# Patient Record
(demographics unavailable — no encounter records)

---

## 2024-10-10 NOTE — ADDENDUM
[FreeTextEntry1] : This note was written by Beth Jara on 10/07/2024 acting solely as a scribe for Dr. Papo Colby.   All medical record entries made by the Scribe were at my, Dr. Papo Colby, direction and personally dictated by me on 10/07/2024. I have personally reviewed the chart and agree that the record accurately reflects my personal performance of the history, physical exam, assessment and plan.

## 2024-10-10 NOTE — HISTORY OF PRESENT ILLNESS
[de-identified] : 63 year old male presents today with left hamstring injury x 1 day. Patient was in parking lot, made a quick move hyperextended his leg and fell back. Patient was able to get up and bear weight after the injury. C/o pain in the hamstring and buttock. He is not taking pain medication.

## 2024-10-10 NOTE — DISCUSSION/SUMMARY
[de-identified] : 64 y/o male with left hamstring injury  Patient presents with pain to the hamstring s/p hyperextension in a parking lot.  Patient is currently undergoing treatment for an Achilles tendon rupture.  Clinically, he has pain over the distal lateral hamstring as well as the proximal hamstring insertion.  I discussed that the mechanism typically results from hip flexion and knee extension. On clinical examination the patient does not have significant posterior ecchymosis or a palpable mass/defect within the thigh.   I discussed the utility of MRI imaging to evaluate for proximal avulsion of the hamstring tendons. I discussed treatment for this that may include conservative management and flexibility/strengthening rehabilitation with physical therapy guidance vs surgical repair. I discussed that MRI imaging would delineate next steps of treatment.   Recommendation; Ice, compression bandage, NSAIDS versus Tylenol to reduce pain and inflammation. MRI Rx given. Activity modification.   Follow-up after MRI

## 2024-10-10 NOTE — HISTORY OF PRESENT ILLNESS
[de-identified] : 63 year old male presents today with left hamstring injury x 1 day. Patient was in parking lot, made a quick move hyperextended his leg and fell back. Patient was able to get up and bear weight after the injury. C/o pain in the hamstring and buttock. He is not taking pain medication.

## 2024-10-10 NOTE — PHYSICAL EXAM
[de-identified] : Left Lower Extremity Exam:  Skin: Clean, dry, intact Inspection: No obvious malalignment, no masses, no swelling. Pulses: 2+ DP/PT pulses ROM: Full knee ROM, full ankle ROM Tenderness: + pain to the distal lateral hamstring, ttp proximal hamstring insertion Stability: Ankle A/P stable, Knee stable varus/valgus/drawer Strength: 3/5 H 5/5 Q/TA/GS/EHL, without atrophy Neuro: In tact to light touch throughout, DTR's normal Additional tests: none [de-identified] : The following radiographs were ordered and read by me during this patients visit. I reviewed each radiograph in detail with the patient and discussed the findings as highlighted below.   Multiple views of the left femur were obtained, 10/07/2024, that show no acute fracture or dislocation. There is no degenerative change seen. There is no gross malalignment. No significant other obvious osseous abnormality, otherwise unremarkable.

## 2024-10-10 NOTE — DISCUSSION/SUMMARY
[de-identified] : 62 y/o male with left hamstring injury  Patient presents with pain to the hamstring s/p hyperextension in a parking lot.  Patient is currently undergoing treatment for an Achilles tendon rupture.  Clinically, he has pain over the distal lateral hamstring as well as the proximal hamstring insertion.  I discussed that the mechanism typically results from hip flexion and knee extension. On clinical examination the patient does not have significant posterior ecchymosis or a palpable mass/defect within the thigh.   I discussed the utility of MRI imaging to evaluate for proximal avulsion of the hamstring tendons. I discussed treatment for this that may include conservative management and flexibility/strengthening rehabilitation with physical therapy guidance vs surgical repair. I discussed that MRI imaging would delineate next steps of treatment.   Recommendation; Ice, compression bandage, NSAIDS versus Tylenol to reduce pain and inflammation. MRI Rx given. Activity modification.   Follow-up after MRI

## 2024-10-10 NOTE — PHYSICAL EXAM
[de-identified] : Left Lower Extremity Exam:  Skin: Clean, dry, intact Inspection: No obvious malalignment, no masses, no swelling. Pulses: 2+ DP/PT pulses ROM: Full knee ROM, full ankle ROM Tenderness: + pain to the distal lateral hamstring, ttp proximal hamstring insertion Stability: Ankle A/P stable, Knee stable varus/valgus/drawer Strength: 3/5 H 5/5 Q/TA/GS/EHL, without atrophy Neuro: In tact to light touch throughout, DTR's normal Additional tests: none [de-identified] : The following radiographs were ordered and read by me during this patients visit. I reviewed each radiograph in detail with the patient and discussed the findings as highlighted below.   Multiple views of the left femur were obtained, 10/07/2024, that show no acute fracture or dislocation. There is no degenerative change seen. There is no gross malalignment. No significant other obvious osseous abnormality, otherwise unremarkable.

## 2024-10-31 NOTE — ADDENDUM
[FreeTextEntry1] : This note was written by Beth Jara on 10/30/2024 acting solely as a scribe for Dr. Papo Colby.   All medical record entries made by the Scribe were at my, Dr. Papo Colby, direction and personally dictated by me on 10/30/2024. I have personally reviewed the chart and agree that the record accurately reflects my personal performance of the history, physical exam, assessment and plan.

## 2024-10-31 NOTE — PHYSICAL EXAM
[de-identified] : Left Lower Extremity Exam:  Skin: Clean, dry, intact Inspection: No obvious malalignment, no masses, no swelling, palpable deformity to the proximal hamstring Pulses: 2+ DP/PT pulses ROM: Full knee ROM, full ankle ROM Tenderness: ttp proximal hamstring insertion Stability: Ankle A/P stable, Knee stable varus/valgus/drawer Strength: 4/5 H 5/5 Q/TA/GS/EHL, without atrophy Neuro: In tact to light touch throughout, DTR's normal Additional tests: none [de-identified] : The following radiographs were ordered and read by me during this patients visit. I reviewed each radiograph in detail with the patient and discussed the findings as highlighted below.   Multiple views of the left femur were obtained, 10/07/2024, that show no acute fracture or dislocation. There is no degenerative change seen. There is no gross malalignment. No significant other obvious osseous abnormality, otherwise unremarkable.   MRI LLE dated 10/22/2024 shows complete tear of the semimembranosus and conjoint tendons at the hamstring origin at the ischial tuberosity with approximately 6 cm retraction.  We independently reviewed and discussed in detail the images and the radiologic reports with the patient.

## 2024-10-31 NOTE — PHYSICAL EXAM
[de-identified] : Left Lower Extremity Exam:  Skin: Clean, dry, intact Inspection: No obvious malalignment, no masses, no swelling, palpable deformity to the proximal hamstring Pulses: 2+ DP/PT pulses ROM: Full knee ROM, full ankle ROM Tenderness: ttp proximal hamstring insertion Stability: Ankle A/P stable, Knee stable varus/valgus/drawer Strength: 4/5 H 5/5 Q/TA/GS/EHL, without atrophy Neuro: In tact to light touch throughout, DTR's normal Additional tests: none [de-identified] : The following radiographs were ordered and read by me during this patients visit. I reviewed each radiograph in detail with the patient and discussed the findings as highlighted below.   Multiple views of the left femur were obtained, 10/07/2024, that show no acute fracture or dislocation. There is no degenerative change seen. There is no gross malalignment. No significant other obvious osseous abnormality, otherwise unremarkable.   MRI LLE dated 10/22/2024 shows complete tear of the semimembranosus and conjoint tendons at the hamstring origin at the ischial tuberosity with approximately 6 cm retraction.  We independently reviewed and discussed in detail the images and the radiologic reports with the patient.

## 2024-10-31 NOTE — DISCUSSION/SUMMARY
[de-identified] : 62 y/o male with left proximal hamstring rupture  Patient presents for follow-up of an injury to the left hamstring. MRI LLE dated 10/22/2024 shows complete tear of the semimembranosus and conjoint tendons at the hamstring origin at the ischial tuberosity with approximately 6 cm retraction. I discussed treatment for this can include conservative management and flexibility/strengthening rehabilitation with physical therapy guidance vs surgical repair. Surgical intervention is recommended for active individuals with high degree of injury as severe rupture can cause significant loss of function/strength.   Patient has concerns as he is scheduled to go on vacation in the upcoming weeks in November.  Patient reports that he would delay surgery until December.  However, given subacute nature of this at this time, we discussed urgency of surgical fixation.  If surgery is performed, he will have difficulty with travel given immobility of the left lower extremity following surgical repair.  I recommend consideration of urgent surgical intervention to repair the tendon. All risks, benefits and alternatives to the proposed surgical procedure, hamstring tendon repair and sciatic nerve neurolysis, as well as the need for formal post-operative rehabilitation were discussed in great detail with the patient. Risk includes but are not limited to pain, bleeding, infection, neurovascular injury, stiffness, medical complications (including DVT, PE, MI), and risks of anesthesia.  The patient expressed understanding and all questions were answered. The patient is considering surgery and will we will accommodate for urgent fixation.

## 2024-10-31 NOTE — HISTORY OF PRESENT ILLNESS
[de-identified] : 63 year old male presents today for follow up left hamstring injury. MRI left lower extremity dated 10/22/2024 shows evidence of complete tear of the semimembranosus and conjoint tendons at the hamstring origin at the ischial tuberosity with approximately 6 cm retraction. Patient states that pain has improved since last visit. Patient was in parking lot, made a quick move hyperextended his leg and fell back. Patient was able to get up and bear weight after the injury.  He is not taking pain medication. Mary undergoing treatment for achilles rupture.

## 2024-10-31 NOTE — DISCUSSION/SUMMARY
[de-identified] : 64 y/o male with left proximal hamstring rupture  Patient presents for follow-up of an injury to the left hamstring. MRI LLE dated 10/22/2024 shows complete tear of the semimembranosus and conjoint tendons at the hamstring origin at the ischial tuberosity with approximately 6 cm retraction. I discussed treatment for this can include conservative management and flexibility/strengthening rehabilitation with physical therapy guidance vs surgical repair. Surgical intervention is recommended for active individuals with high degree of injury as severe rupture can cause significant loss of function/strength.   Patient has concerns as he is scheduled to go on vacation in the upcoming weeks in November.  Patient reports that he would delay surgery until December.  However, given subacute nature of this at this time, we discussed urgency of surgical fixation.  If surgery is performed, he will have difficulty with travel given immobility of the left lower extremity following surgical repair.  I recommend consideration of urgent surgical intervention to repair the tendon. All risks, benefits and alternatives to the proposed surgical procedure, hamstring tendon repair and sciatic nerve neurolysis, as well as the need for formal post-operative rehabilitation were discussed in great detail with the patient. Risk includes but are not limited to pain, bleeding, infection, neurovascular injury, stiffness, medical complications (including DVT, PE, MI), and risks of anesthesia.  The patient expressed understanding and all questions were answered. The patient is considering surgery and will we will accommodate for urgent fixation.

## 2024-10-31 NOTE — HISTORY OF PRESENT ILLNESS
[de-identified] : 63 year old male presents today for follow up left hamstring injury. MRI left lower extremity dated 10/22/2024 shows evidence of complete tear of the semimembranosus and conjoint tendons at the hamstring origin at the ischial tuberosity with approximately 6 cm retraction. Patient states that pain has improved since last visit. Patient was in parking lot, made a quick move hyperextended his leg and fell back. Patient was able to get up and bear weight after the injury.  He is not taking pain medication. Mary undergoing treatment for achilles rupture.

## 2024-11-22 NOTE — ADDENDUM
[FreeTextEntry1] : This note was written by Beth Jara on 11/18/2024 acting solely as a scribe for Dr. Papo Colby.   All medical record entries made by the Scribe were at my, Dr. Papo Colby, direction and personally dictated by me on 11/18/2024. I have personally reviewed the chart and agree that the record accurately reflects my personal performance of the history, physical exam, assessment and plan.

## 2024-11-22 NOTE — HISTORY OF PRESENT ILLNESS
[0] : no pain reported [Clean/Dry/Intact] : clean, dry and intact [Healed] : healed [Neuro Intact] : an unremarkable neurological exam [Vascular Intact] : ~T peripheral vascular exam normal [Doing Well] : is doing well [Excellent Pain Control] : has excellent pain control [No Sign of Infection] : is showing no signs of infection [Sutures Removed] : sutures were removed [Steri-Strips Removed & Replaced] : steri-strips removed and replaced [Chills] : no chills [Fever] : no fever [Nausea] : no nausea [Vomiting] : no vomiting [Erythema] : not erythematous [Discharge] : absent of discharge [Swelling] : not swollen [Dehiscence] : not dehisced [de-identified] : 64-year-old male s/p left proximal hamstring repair, sciatic nerve neurolysis.11/6/24 [de-identified] : 64-year-old male s/p left proximal hamstring repair, sciatic nerve neurolysis. Patient presents in brace and ambulating via crutches. Takes Ibuprofen as needed. Denies post op complications.  [de-identified] : Left hip exam  Skin: Incision(s) clean, dry, intact, no drainage, healed Inspection: Residual swelling, min residual effusion Pulses: 2+ DP/PT pulses ROM: not tested Tenderness: diffuse Stability: Stable Strength: Intact  Neuro: Intact to light touch throughout [de-identified] : 64-year-old male s/p left proximal hamstring repair, sciatic nerve neurolysis.  All questions were answered and rehabilitation protocol was reviewed in detail.  Recommendations:  1. PT evaluation and treatment. Progress protocol as provided. Hamstring precautions (No hip flexion/knee extension, avoid sitting for prolonged periods) 2. Brace: Hinged [place] x 4weeks at 30 degrees, toe-touch weightbearing with crutches.  3. Meds: Wean pain medication, transition to NSAIDS/Tylenol as tolerated. 4. Ice/elevate as needed 5. Restrictions: As discussed  Followup in 4 weeks.

## 2024-11-22 NOTE — HISTORY OF PRESENT ILLNESS
[0] : no pain reported [Clean/Dry/Intact] : clean, dry and intact [Healed] : healed [Neuro Intact] : an unremarkable neurological exam [Vascular Intact] : ~T peripheral vascular exam normal [Doing Well] : is doing well [Excellent Pain Control] : has excellent pain control [No Sign of Infection] : is showing no signs of infection [Sutures Removed] : sutures were removed [Steri-Strips Removed & Replaced] : steri-strips removed and replaced [Chills] : no chills [Fever] : no fever [Nausea] : no nausea [Vomiting] : no vomiting [Erythema] : not erythematous [Discharge] : absent of discharge [Swelling] : not swollen [Dehiscence] : not dehisced [de-identified] : 64-year-old male s/p left proximal hamstring repair, sciatic nerve neurolysis.11/6/24 [de-identified] : 64-year-old male s/p left proximal hamstring repair, sciatic nerve neurolysis. Patient presents in brace and ambulating via crutches. Takes Ibuprofen as needed. Denies post op complications.  [de-identified] : Left hip exam  Skin: Incision(s) clean, dry, intact, no drainage, healed Inspection: Residual swelling, min residual effusion Pulses: 2+ DP/PT pulses ROM: not tested Tenderness: diffuse Stability: Stable Strength: Intact  Neuro: Intact to light touch throughout [de-identified] : 64-year-old male s/p left proximal hamstring repair, sciatic nerve neurolysis.  All questions were answered and rehabilitation protocol was reviewed in detail.  Recommendations:  1. PT evaluation and treatment. Progress protocol as provided. Hamstring precautions (No hip flexion/knee extension, avoid sitting for prolonged periods) 2. Brace: Hinged [place] x 4weeks at 30 degrees, toe-touch weightbearing with crutches.  3. Meds: Wean pain medication, transition to NSAIDS/Tylenol as tolerated. 4. Ice/elevate as needed 5. Restrictions: As discussed  Followup in 4 weeks.

## 2024-12-23 NOTE — HISTORY OF PRESENT ILLNESS
[0] : no pain reported [Clean/Dry/Intact] : clean, dry and intact [Healed] : healed [Neuro Intact] : an unremarkable neurological exam [Vascular Intact] : ~T peripheral vascular exam normal [Doing Well] : is doing well [Excellent Pain Control] : has excellent pain control [No Sign of Infection] : is showing no signs of infection [Chills] : no chills [Fever] : no fever [Nausea] : no nausea [Vomiting] : no vomiting [Erythema] : not erythematous [Discharge] : absent of discharge [Swelling] : not swollen [Dehiscence] : not dehisced [de-identified] : 64-year-old male s/p left proximal hamstring repair, sciatic nerve neurolysis.11/6/24 [de-identified] : 64-year-old male s/p left proximal hamstring repair, sciatic nerve neurolysis. Patient presents in brace and ambulating independently. Takes Ibuprofen as needed. Denies post op complications. No numbness/tingling. [de-identified] : Left hip exam  Skin: Incision(s) clean, dry, intact, no drainage, healed, no palpable defect Inspection: No swelling Pulses: 2+ DP/PT pulses ROM: 0-120 hip flexion Tenderness: Min Stability: Stable Strength: Intact  Neuro: Intact to light touch throughout [de-identified] : 64-year-old male s/p left proximal hamstring repair, sciatic nerve neurolysis.  A discussion was had regarding current improvements with physical therapy and postoperative program.  Second phase of recovery program was discussed in detail.  Patient is doing well at this time without any perioperative concerns.     Recommendations:   1.  Continue PT as per protocol. Hamstring precautions (No hip flexion/knee extension) 2. Brace: Discontinue knee brace as tolerated. 3. Meds: Symptomatic NSAIDS/Tylenol as needed 4. Ice as needed  5. Restrictions: Continued hamstring precautions.  Follow-up in 6 weeks

## 2024-12-23 NOTE — HISTORY OF PRESENT ILLNESS
[0] : no pain reported [Clean/Dry/Intact] : clean, dry and intact [Healed] : healed [Neuro Intact] : an unremarkable neurological exam [Vascular Intact] : ~T peripheral vascular exam normal [Doing Well] : is doing well [Excellent Pain Control] : has excellent pain control [No Sign of Infection] : is showing no signs of infection [Chills] : no chills [Fever] : no fever [Nausea] : no nausea [Vomiting] : no vomiting [Erythema] : not erythematous [Discharge] : absent of discharge [Swelling] : not swollen [Dehiscence] : not dehisced [de-identified] : 64-year-old male s/p left proximal hamstring repair, sciatic nerve neurolysis.11/6/24 [de-identified] : 64-year-old male s/p left proximal hamstring repair, sciatic nerve neurolysis. Patient presents in brace and ambulating independently. Takes Ibuprofen as needed. Denies post op complications. No numbness/tingling. [de-identified] : Left hip exam  Skin: Incision(s) clean, dry, intact, no drainage, healed, no palpable defect Inspection: No swelling Pulses: 2+ DP/PT pulses ROM: 0-120 hip flexion Tenderness: Min Stability: Stable Strength: Intact  Neuro: Intact to light touch throughout [de-identified] : 64-year-old male s/p left proximal hamstring repair, sciatic nerve neurolysis.  A discussion was had regarding current improvements with physical therapy and postoperative program.  Second phase of recovery program was discussed in detail.  Patient is doing well at this time without any perioperative concerns.     Recommendations:   1.  Continue PT as per protocol. Hamstring precautions (No hip flexion/knee extension) 2. Brace: Discontinue knee brace as tolerated. 3. Meds: Symptomatic NSAIDS/Tylenol as needed 4. Ice as needed  5. Restrictions: Continued hamstring precautions.  Follow-up in 6 weeks

## 2024-12-23 NOTE — ADDENDUM
[FreeTextEntry1] : This note was written by Beth Jara on 12/19/2024 acting solely as a scribe for Dr. Papo Colby.   All medical record entries made by the Scribe were at my, Dr. Papo Colby, direction and personally dictated by me on 12/19/2024. I have personally reviewed the chart and agree that the record accurately reflects my personal performance of the history, physical exam, assessment and plan.

## 2025-02-21 NOTE — HISTORY OF PRESENT ILLNESS
[de-identified] : 64-year-old male s/p left proximal hamstring repair, sciatic nerve neurolysis. He has been participating in PT with good improvement; however, he missed a few sessions. He is not taking any medications for the pain. He has no complaints at this time. He does complain that his ankle still feels weak. He is s/p Achilles tenon rupture in July, treated nonoperatively.

## 2025-02-21 NOTE — DISCUSSION/SUMMARY
[de-identified] : 64-year-old male s/p left proximal hamstring repair, sciatic nerve neurolysis, left achilles tendon tear  A discussion was had regarding current improvements with physical therapy and postoperative program. Next phase of recovery program was discussed in detail. Clinically, both injuries are stable and show signs of full healing. I explained that at this time the issue for both his ankle injury and hamstring is weakness throughout the limb as his LLE is significantly weaker than the contralateral side. Discussed physical therapy with focus on strengthening and conditioning as well as return to light activity.   Recommendations: 1. Continue PT as per protocol for lower extremity weakness 2. Brace: None 3. Meds: Symptomatic NSAIDS/Tylenol as needed  4. Ice as needed  Follow-up in 2 months

## 2025-02-21 NOTE — ADDENDUM
[FreeTextEntry1] : This note was written by Beth Jara on 02/20/2025 acting solely as a scribe for Dr. Papo Colby.   All medical record entries made by the Scribe were at my, Dr. Papo Colby, direction and personally dictated by me on 02/20/2025. I have personally reviewed the chart and agree that the record accurately reflects my personal performance of the history, physical exam, assessment and plan.

## 2025-02-21 NOTE — PHYSICAL EXAM
[de-identified] : Left hip exam  Skin: Incision(s) clean, dry, intact, no drainage, healed, no palpable defect Inspection: No swelling Pulses: 2+ DP/PT pulses ROM: 0-120 hip flexion Tenderness: Min Stability: Stable Strength: Intact Neuro: Intact to light touch throughout.  Left Ankle exam:  Skin: Clean, dry, intact Inspection: No obvious malalignment, no swelling, no effusion, +thickening to the Achilles  Pulses: 2+ DP/PT pulses ROM: normal degrees of dorsiflexion, normal degrees of plantarflexion, normal subtalar motion. Tenderness: No tenderness over the lateral malleolus, no CFL/ATFL/PTFL pain. No medial malleolus pain, no deltoid ligament pain. No proximal fibular pain. No heel pain. Stability: Negative anterior drawer, negative posterior drawer. Strength: 5/5 TA/GS/EHL 5/5 inversion/eversion, weak calf/atrophy Neuro: In tact to light touch throughout Additional test: Negative syndesmosis squeeze test, Negative Alvarado test [[id number]]

## 2025-02-21 NOTE — HISTORY OF PRESENT ILLNESS
[de-identified] : 64-year-old male s/p left proximal hamstring repair, sciatic nerve neurolysis. He has been participating in PT with good improvement; however, he missed a few sessions. He is not taking any medications for the pain. He has no complaints at this time. He does complain that his ankle still feels weak. He is s/p Achilles tenon rupture in July, treated nonoperatively.

## 2025-02-21 NOTE — PHYSICAL EXAM
[de-identified] : Left hip exam  Skin: Incision(s) clean, dry, intact, no drainage, healed, no palpable defect Inspection: No swelling Pulses: 2+ DP/PT pulses ROM: 0-120 hip flexion Tenderness: Min Stability: Stable Strength: Intact Neuro: Intact to light touch throughout.  Left Ankle exam:  Skin: Clean, dry, intact Inspection: No obvious malalignment, no swelling, no effusion, +thickening to the Achilles  Pulses: 2+ DP/PT pulses ROM: normal degrees of dorsiflexion, normal degrees of plantarflexion, normal subtalar motion. Tenderness: No tenderness over the lateral malleolus, no CFL/ATFL/PTFL pain. No medial malleolus pain, no deltoid ligament pain. No proximal fibular pain. No heel pain. Stability: Negative anterior drawer, negative posterior drawer. Strength: 5/5 TA/GS/EHL 5/5 inversion/eversion, weak calf/atrophy Neuro: In tact to light touch throughout Additional test: Negative syndesmosis squeeze test, Negative Alvarado test

## 2025-02-21 NOTE — DISCUSSION/SUMMARY
[de-identified] : 64-year-old male s/p left proximal hamstring repair, sciatic nerve neurolysis, left achilles tendon tear  A discussion was had regarding current improvements with physical therapy and postoperative program. Next phase of recovery program was discussed in detail. Clinically, both injuries are stable and show signs of full healing. I explained that at this time the issue for both his ankle injury and hamstring is weakness throughout the limb as his LLE is significantly weaker than the contralateral side. Discussed physical therapy with focus on strengthening and conditioning as well as return to light activity.   Recommendations: 1. Continue PT as per protocol for lower extremity weakness 2. Brace: None 3. Meds: Symptomatic NSAIDS/Tylenol as needed  4. Ice as needed  Follow-up in 2 months
